# Patient Record
Sex: MALE | Race: WHITE | NOT HISPANIC OR LATINO | Employment: STUDENT | URBAN - METROPOLITAN AREA
[De-identification: names, ages, dates, MRNs, and addresses within clinical notes are randomized per-mention and may not be internally consistent; named-entity substitution may affect disease eponyms.]

---

## 2022-07-26 ENCOUNTER — TELEPHONE (OUTPATIENT)
Dept: PEDIATRICS | Facility: CLINIC | Age: 22
End: 2022-07-26
Payer: COMMERCIAL

## 2022-07-26 NOTE — TELEPHONE ENCOUNTER
----- Message from Lv Cespedes MA sent at 7/26/2022 12:06 PM CDT -----  Contact: [phone]  Mom called            Mom would like for staff to give a call back in regards to know when was Jay[place] Hepatitis B vaccine  administered.        Call back  [phone]

## 2022-08-31 ENCOUNTER — TELEPHONE (OUTPATIENT)
Dept: PEDIATRICS | Facility: CLINIC | Age: 22
End: 2022-08-31
Payer: COMMERCIAL

## 2022-08-31 NOTE — TELEPHONE ENCOUNTER
----- Message from Ena Edwards sent at 8/31/2022  3:06 PM CDT -----  Regarding: Former pt  Pt mom/dad/guardian would like to be called back regarding Hep B vaccine. Pt can't remember if he received it and if not would like advise as to where he can go.    Pt mom/dad/guardian can be reached at [phone]